# Patient Record
Sex: FEMALE | Race: WHITE | Employment: OTHER | URBAN - METROPOLITAN AREA
[De-identification: names, ages, dates, MRNs, and addresses within clinical notes are randomized per-mention and may not be internally consistent; named-entity substitution may affect disease eponyms.]

---

## 2019-06-25 ENCOUNTER — HOSPITAL ENCOUNTER (OUTPATIENT)
Age: 79
Setting detail: OUTPATIENT SURGERY
Discharge: HOME | End: 2019-06-25
Attending: ANESTHESIOLOGY | Admitting: ANESTHESIOLOGY
Payer: MEDICARE

## 2019-06-25 ENCOUNTER — APPOINTMENT (OUTPATIENT)
Dept: GENERAL RADIOLOGY | Age: 79
End: 2019-06-25
Attending: ANESTHESIOLOGY
Payer: MEDICARE

## 2019-06-25 VITALS
TEMPERATURE: 98 F | DIASTOLIC BLOOD PRESSURE: 78 MMHG | BODY MASS INDEX: 26.75 KG/M2 | WEIGHT: 151 LBS | HEART RATE: 80 BPM | SYSTOLIC BLOOD PRESSURE: 165 MMHG | OXYGEN SATURATION: 98 % | RESPIRATION RATE: 16 BRPM

## 2019-06-25 PROCEDURE — 77030031505 HC PRB PAIN MGMT SNRGY BAYL -E: Performed by: ANESTHESIOLOGY

## 2019-06-25 PROCEDURE — 74011250636 HC RX REV CODE- 250/636: Performed by: ANESTHESIOLOGY

## 2019-06-25 PROCEDURE — 76210000020 HC REC RM PH II FIRST 0.5 HR: Performed by: ANESTHESIOLOGY

## 2019-06-25 PROCEDURE — 77030014125 HC TY EPDRL BBMI -B: Performed by: ANESTHESIOLOGY

## 2019-06-25 PROCEDURE — 74011250636 HC RX REV CODE- 250/636

## 2019-06-25 PROCEDURE — 99152 MOD SED SAME PHYS/QHP 5/>YRS: CPT

## 2019-06-25 PROCEDURE — 77030030797 HC PRB ENDOSC SINRGY AVNM -G: Performed by: ANESTHESIOLOGY

## 2019-06-25 PROCEDURE — 76010000154 HC OR TIME FIRST 0.5 HR: Performed by: ANESTHESIOLOGY

## 2019-06-25 RX ORDER — LIDOCAINE HYDROCHLORIDE 20 MG/ML
INJECTION, SOLUTION EPIDURAL; INFILTRATION; INTRACAUDAL; PERINEURAL AS NEEDED
Status: DISCONTINUED | OUTPATIENT
Start: 2019-06-25 | End: 2019-06-25 | Stop reason: HOSPADM

## 2019-06-25 RX ORDER — MIDAZOLAM HYDROCHLORIDE 1 MG/ML
INJECTION, SOLUTION INTRAMUSCULAR; INTRAVENOUS AS NEEDED
Status: DISCONTINUED | OUTPATIENT
Start: 2019-06-25 | End: 2019-06-25 | Stop reason: HOSPADM

## 2019-06-25 RX ORDER — FENTANYL CITRATE 50 UG/ML
INJECTION, SOLUTION INTRAMUSCULAR; INTRAVENOUS AS NEEDED
Status: DISCONTINUED | OUTPATIENT
Start: 2019-06-25 | End: 2019-06-25 | Stop reason: HOSPADM

## 2019-06-25 NOTE — PROCEDURES
[facility]  PROCEDURE NOTE    Name:  Sanjeev Lopez  MR#:  [id number]  :  1940  ACCOUNT #:  [de-identified]  DATE OF SERVICE:  2019    PREOPERATIVE DIAGNOSIS:  Degenerative joint disease of the right knee with severe right knee pain. POSTOPERATIVE DIAGNOSIS:  Degenerative joint disease of the right knee with severe right knee pain. PROCEDURE PERFORMED:  1.  Radiofrequency ablation to the right superior medial genicular nerve. 2.  Radiofrequency ablation to the right superior lateral genicular nerve. 3.  Radiofrequency ablation to the right inferior medial nerve. 4.  Fluoroscopy. 5.  IV sedation. SURGEON:  Micah Montgomery MD    ASSISTANT:  None. ANESTHESIA:  Local with IV sedation. ESTIMATED BLOOD LOSS:  None. FLUIDS:  None. SPECIMENS REMOVED:  None. INTRAOPERATIVE FINDINGS:  Text. COMPLICATIONS:  None. CONDITION:  Stable. IMPLANTS:  None. INDICATIONS:  The patient is an 52-year-old female with severe right knee pain who presents today for radiofrequency ablation to the genicular nerves of her right knee at [facility]. PROCEDURE:  After informed consent was obtained, a 22-gauge IV was placed in the right arm. The patient was taken to the operating room and positioned supine. Monitors were applied and vital signs were stable. The right knee was prepped and draped in the usual sterile fashion. A small skin wheal was made over the right superior medial aspect of the knee using 1% lidocaine. Next, a 7.5-cm radiofrequency needle with a 5-mm active tip was advanced until contact was made with bone. The needle was advanced off the bone to a distance care home the diaphysis of the distal femur. Views were confirmed in AP and lateral fluoroscopy. Sensory stimulation at 50 Hz up to 3 volts produced pain at less than 1 volt. Motor stimulation at 2 Hz up to 3 volts produced no movement of the right leg or foot.   Next, 2 mL of 1% lidocaine were injected. After 2 minutes, radiofrequency lesion was carried out at 60 degrees Celsius for 2 minutes and 30 seconds. The same procedure was repeated over the right superior lateral aspect of the knee and the right inferior medial aspect of the knee. At each level, sensory stimulation at 50 Hz up to 3 volts produced pain at less than 1.5 volts, and motor stimulation at 2 Hz up to 3 volts produced no movement of the right leg or foot. At each level, radiofrequency lesioning was carried out at 60 degrees Celsius for 2 minutes and 30 seconds. She tolerated the entire procedure well. There were no complications. She was transferred to the recovery room in satisfactory condition. All fluoroscopic views were interpreted by me. PLANS:  1.  I recommended continuing her current medications. She takes Norco 10/325 q.6-8 hours as needed and Relafen 500 mg b.i.d.  2.  I will see her back at her next regularly scheduled followup visit for refill of her medications.       Ren Tello MD      [email or web address]  D:  06/25/2019 8:36  T:  06/25/2019 8:41  JOB #:  [id number]

## 2019-06-25 NOTE — BRIEF OP NOTE
BRIEF OPERATIVE NOTE    Date of Procedure: 6/25/2019   Preoperative Diagnosis: Right knee pain, unspecified chronicity [M25.561]  Postoperative Diagnosis: Right knee pain, unspecified chronicity [M25.561]    Procedure(s):  RIGHT KNEE RADIOFREQUENCY ABLATION  Surgeon(s) and Role:     * Mukesh Brown MD - Primary         Surgical Assistant: none    Surgical Staff:  Circ-1: Juan Carlos Callaway RN  Local Nurse Monitor:  Jessica Womack RN  Radiology Technician: Frank Wilburn RT, R, CT  Scrub Tech-1: Jun Taylor  Event Time In Time Out   Incision Start 1402    Incision Close 5017      Anesthesia: Con-Sed   Estimated Blood Loss: none  Specimens: * No specimens in log *   Findings: none  Complications: none  Implants: * No implants in log *

## 2019-06-25 NOTE — DISCHARGE INSTRUCTIONS
Pain Management Aftercare    Common Side Effects that may last 8-12 hours:  Drowsiness  Slurred speech  Dizziness  Poor balance  Blurred vision     Hangover effect (headache, upset stomach, etc.)    Aftercare Instructions: You must have a responsible adult drive you home. Do not drive a car or operate equipment for at least 12 hours. Do not take any new medications for at least 24 hours unless your doctor has prescribed them and he is aware that you are taking them. Do not drink any alcoholic beverages until the next day. Advance to your normal diet as tolerated. Expect soreness at the injection site that will improve over the next 24 hours. Avoid strenuous exercise or heavy lifting. Pre-injection activities may be resumed tomorrow (unless instructed otherwise by your doctor). Notify your doctor immediately if any of the following symptoms occur:  Severe pain at the injection site  Bleeding or drainage from injection site  Fever 101 degrees F or greater  New or increased weakness/numbness of extremities that does not resolve  Severe headache that disappears or gets better when you lie down  Breathing difficulty  Skin rash  Vomiting  Seizures  Unusual drowsiness    Doctor's Phone Number: [phone]    Follow-up Care:      DISCHARGE SUMMARY from Nurse    PATIENT INSTRUCTIONS:    After general anesthesia or intravenous sedation, for 24 hours or while taking prescription Narcotics:  · Limit your activities  · Do not drive and operate hazardous machinery  · Do not make important personal or business decisions  · Do  not drink alcoholic beverages  · If you have not urinated within 8 hours after discharge, please contact your surgeon on call. *  Please give a list of your current medications to your Primary Care Provider. *  Please update this list whenever your medications are discontinued, doses are      changed, or new medications (including over-the-counter products) are added.     *  Please carry medication information at all times in case of emergency situations. These are general instructions for a healthy lifestyle:    No smoking/ No tobacco products/ Avoid exposure to second hand smoke    Surgeon General's Warning:  Quitting smoking now greatly reduces serious risk to your health. Obesity, smoking, and sedentary lifestyle greatly increases your risk for illness    A healthy diet, regular physical exercise & weight monitoring are important for maintaining a healthy lifestyle    You may be retaining fluid if you have a history of heart failure or if you experience any of the following symptoms:  Weight gain of 3 pounds or more overnight or 5 pounds in a week, increased swelling in our hands or feet or shortness of breath while lying flat in bed. Please call your doctor as soon as you notice any of these symptoms; do not wait until your next office visit. Recognize signs and symptoms of STROKE:    F-face looks uneven    A-arms unable to move or move unevenly    S-speech slurred or non-existent    T-time-call 911 as soon as signs and symptoms begin-DO NOT go       Back to bed or wait to see if you get better-TIME IS BRAIN. ACTIVITY  · As tolerated and as directed by your doctor. · Bathe or shower as directed by your doctor. DIET  · Clear liquids until no nausea or vomiting; then light diet for the first day. · Advance to regular diet on second day, unless your doctor orders otherwise. · If nausea and vomiting continues, call your doctor. PAIN  · Take pain medication as directed by your doctor. · Call your doctor if pain is NOT relieved by medication. · DO NOT take aspirin of blood thinners unless directed by your doctor.      DRESSING CARE       CALL YOUR DOCTOR IF   · Excessive bleeding that does not stop after holding pressure over the area  · Temperature of 101 degrees F or above  · Excessive redness, swelling or bruising, and/ or green or yellow, smelly discharge from incision    AFTER ANESTHESIA   · For the first 24 hours: DO NOT Drive, Drink alcoholic beverages, or Make important decisions. · Be aware of dizziness following anesthesia and while taking pain medication. APPOINTMENT DATE/ TIME    YOUR DOCTOR'S PHONE NUMBER       DISCHARGE SUMMARY from Nurse    PATIENT INSTRUCTIONS:    After general anesthesia or intravenous sedation, for 24 hours or while taking prescription Narcotics:  · Limit your activities  · Do not drive and operate hazardous machinery  · Do not make important personal or business decisions  · Do  not drink alcoholic beverages  · If you have not urinated within 8 hours after discharge, please contact your surgeon on call. *  Please give a list of your current medications to your Primary Care Provider. *  Please update this list whenever your medications are discontinued, doses are      changed, or new medications (including over-the-counter products) are added. *  Please carry medication information at all times in case of emergency situations. These are general instructions for a healthy lifestyle:    No smoking/ No tobacco products/ Avoid exposure to second hand smoke    Surgeon General's Warning:  Quitting smoking now greatly reduces serious risk to your health. Obesity, smoking, and sedentary lifestyle greatly increases your risk for illness    A healthy diet, regular physical exercise & weight monitoring are important for maintaining a healthy lifestyle    You may be retaining fluid if you have a history of heart failure or if you experience any of the following symptoms:  Weight gain of 3 pounds or more overnight or 5 pounds in a week, increased swelling in our hands or feet or shortness of breath while lying flat in bed. Please call your doctor as soon as you notice any of these symptoms; do not wait until your next office visit.     Recognize signs and symptoms of STROKE:    F-face looks uneven    A-arms unable to move or move unevenly    S-speech slurred or non-existent    T-time-call 911 as soon as signs and symptoms begin-DO NOT go       Back to bed or wait to see if you get better-TIME IS BRAIN.

## 2019-06-25 NOTE — H&P
[address] [place] [address]  HISTORY AND PHYSICAL    Name:  Trina Rojas  MR#:  [id number]  :  1940  ACCOUNT #:  [de-identified]  ADMIT DATE:  2019    HISTORY OF PRESENT ILLNESS:  The patient is a very pleasant 78-year-old female with severe pain in her right knee. She has undergone diagnostic genicular nerve blocks with excellent results and she presents for thermal ablation to the genicular nerves of her right knee at [facility]. PAST MEDICAL HISTORY:  Generalized osteoarthritis. PAST SURGICAL HISTORY:  None. CURRENT MEDICATIONS:  1. Norco 10/325 q. 6 hours as needed. 2.  Relafen 500 mg b.i.d. ALLERGIES:  NO KNOWN DRUG ALLERGIES. SOCIAL HISTORY:  Denies smoking or alcohol abuse. FAMILY HISTORY:  Noncontributory. REVIEW OF SYSTEMS:  Noncontributory. PHYSICAL EXAMINATION:  GENERAL APPEARANCE:  Very pleasant elderly white female sitting in a chair in no acute distress. VITAL SIGNS:  Afebrile. Vital signs stable. CHEST:  Clear. HEART:  Regular rate and rhythm without murmur. ABDOMEN:  Soft, positive bowel sounds. Nontender. No masses. EXTREMITIES:  No clubbing, cyanosis or edema. MUSCULOSKELETAL:  Exam reveals some pain and stiffness on range of motion of the right knee. NEUROLOGIC:  Exam was deferred. SUMMARY:  This is a pleasant 78-year-old female with severe right knee pain who presents for radiofrequency thermal ablation to the genicular nerves of her right knee at [facility]. ASSESSMENT:  Severe right knee pain secondary to degenerative arthritis. PLAN:  The patient will be taken to the operating room tomorrow for radiofrequency thermal ablation to the genicular nerves of her right knee at [facility].         MD SABRINA [email or web address]  D:  2019 19:27  T:  2019 19:30  JOB #:  [id number]

## (undated) DEVICE — DRAPE SHT 3 QTR PROXIMA 53X77 --

## (undated) DEVICE — ELECTRODE ES AD DISPER HYDRGEL THN FOAM ADH SCALLOPED EDGE

## (undated) DEVICE — PROBE RF L75MM OD17GA 4MM PAIN MGMT SINERGY

## (undated) DEVICE — COOLIEF* SINERGY* COOLED RADIOFREQUENCY PROBE KIT: Brand: AVANOS

## (undated) DEVICE — PLASTIC ADHESIVE BANDAGE: Brand: CURITY

## (undated) DEVICE — DRAPE TWL SURG 16X26IN BLU ORB04] ALLCARE INC]

## (undated) DEVICE — Device

## (undated) DEVICE — SYR 10ML LUER LOK 1/5ML GRAD --

## (undated) DEVICE — SET IV ADMIN L55IN ID0054IN EXTN MICBOR TBNG W